# Patient Record
Sex: MALE | Race: WHITE | NOT HISPANIC OR LATINO | Employment: OTHER | URBAN - METROPOLITAN AREA
[De-identification: names, ages, dates, MRNs, and addresses within clinical notes are randomized per-mention and may not be internally consistent; named-entity substitution may affect disease eponyms.]

---

## 2021-07-16 ENCOUNTER — OFFICE VISIT (OUTPATIENT)
Dept: UROLOGY | Facility: CLINIC | Age: 61
End: 2021-07-16
Payer: OTHER GOVERNMENT

## 2021-07-16 VITALS
HEART RATE: 74 BPM | DIASTOLIC BLOOD PRESSURE: 81 MMHG | HEIGHT: 71 IN | WEIGHT: 165 LBS | BODY MASS INDEX: 23.1 KG/M2 | SYSTOLIC BLOOD PRESSURE: 117 MMHG

## 2021-07-16 DIAGNOSIS — R53.83 FATIGUE, UNSPECIFIED TYPE: ICD-10-CM

## 2021-07-16 DIAGNOSIS — N40.1 BPH WITH URINARY OBSTRUCTION: Primary | ICD-10-CM

## 2021-07-16 DIAGNOSIS — N13.8 BPH WITH URINARY OBSTRUCTION: Primary | ICD-10-CM

## 2021-07-16 DIAGNOSIS — N48.6 PEYRONIE DISEASE: ICD-10-CM

## 2021-07-16 DIAGNOSIS — N52.9 ERECTILE DYSFUNCTION, UNSPECIFIED ERECTILE DYSFUNCTION TYPE: ICD-10-CM

## 2021-07-16 LAB
HCT VFR BLD AUTO: 47.1 % (ref 42–52)
HGB BLD-MCNC: 15.9 G/DL (ref 14–18)
POC RESIDUAL URINE VOLUME: 57 ML (ref 0–100)
TESTOST SERPL-MCNC: 849 NG/DL (ref 193–740)

## 2021-07-16 PROCEDURE — 51798 US URINE CAPACITY MEASURE: CPT | Mod: S$GLB,,, | Performed by: NURSE PRACTITIONER

## 2021-07-16 PROCEDURE — 51798 POCT BLADDER SCAN: ICD-10-PCS | Mod: S$GLB,,, | Performed by: NURSE PRACTITIONER

## 2021-07-16 PROCEDURE — 99204 PR OFFICE/OUTPT VISIT, NEW, LEVL IV, 45-59 MIN: ICD-10-PCS | Mod: S$GLB,,, | Performed by: NURSE PRACTITIONER

## 2021-07-16 PROCEDURE — 99204 OFFICE O/P NEW MOD 45 MIN: CPT | Mod: S$GLB,,, | Performed by: NURSE PRACTITIONER

## 2021-07-16 RX ORDER — ALFUZOSIN HYDROCHLORIDE 10 MG/1
TABLET, EXTENDED RELEASE ORAL
COMMUNITY
End: 2021-07-27

## 2021-07-16 RX ORDER — LEVOCETIRIZINE DIHYDROCHLORIDE 5 MG/1
TABLET, FILM COATED ORAL
COMMUNITY

## 2021-07-19 ENCOUNTER — TELEPHONE (OUTPATIENT)
Dept: UROLOGY | Facility: CLINIC | Age: 61
End: 2021-07-19

## 2021-07-27 ENCOUNTER — OFFICE VISIT (OUTPATIENT)
Dept: UROLOGY | Facility: CLINIC | Age: 61
End: 2021-07-27
Payer: OTHER GOVERNMENT

## 2021-07-27 VITALS
SYSTOLIC BLOOD PRESSURE: 120 MMHG | DIASTOLIC BLOOD PRESSURE: 81 MMHG | HEART RATE: 86 BPM | WEIGHT: 165 LBS | BODY MASS INDEX: 23.1 KG/M2 | HEIGHT: 71 IN

## 2021-07-27 DIAGNOSIS — N13.8 BPH WITH URINARY OBSTRUCTION: ICD-10-CM

## 2021-07-27 DIAGNOSIS — N40.1 BPH WITH URINARY OBSTRUCTION: ICD-10-CM

## 2021-07-27 DIAGNOSIS — N48.6 PEYRONIE DISEASE: Primary | ICD-10-CM

## 2021-07-27 DIAGNOSIS — N52.9 ERECTILE DYSFUNCTION, UNSPECIFIED ERECTILE DYSFUNCTION TYPE: ICD-10-CM

## 2021-07-27 PROBLEM — R06.09 DYSPNEA ON EXERTION: Status: ACTIVE | Noted: 2021-07-27

## 2021-07-27 PROBLEM — R51.9 UNILATERAL HEADACHE: Status: ACTIVE | Noted: 2021-07-27

## 2021-07-27 PROBLEM — R53.83 FATIGUE: Status: ACTIVE | Noted: 2017-07-26

## 2021-07-27 PROBLEM — M54.50 LOW BACK PAIN: Status: ACTIVE | Noted: 2021-07-27

## 2021-07-27 PROBLEM — K59.09 CHRONIC CONSTIPATION: Status: ACTIVE | Noted: 2017-08-11

## 2021-07-27 PROBLEM — B00.9 HERPES SIMPLEX: Status: ACTIVE | Noted: 2021-07-27

## 2021-07-27 PROBLEM — R31.9 BLOOD IN URINE: Status: ACTIVE | Noted: 2017-11-02

## 2021-07-27 PROBLEM — R10.9 RIGHT FLANK PAIN: Status: ACTIVE | Noted: 2017-10-05

## 2021-07-27 PROBLEM — R20.2 PARESTHESIA OF UPPER LIMB: Status: ACTIVE | Noted: 2021-07-27

## 2021-07-27 PROBLEM — M54.12 CERVICAL RADICULOPATHY: Status: ACTIVE | Noted: 2021-07-27

## 2021-07-27 PROBLEM — F43.29 ADJUSTMENT DISORDER WITH MIXED EMOTIONAL FEATURES: Status: ACTIVE | Noted: 2017-07-26

## 2021-07-27 PROBLEM — K12.1 ULCER OF MOUTH: Status: ACTIVE | Noted: 2021-07-27

## 2021-07-27 PROBLEM — M19.019 ARTHRITIS OF SHOULDER: Status: ACTIVE | Noted: 2017-12-03

## 2021-07-27 PROCEDURE — 99213 OFFICE O/P EST LOW 20 MIN: CPT | Mod: S$GLB,,, | Performed by: UROLOGY

## 2021-07-27 PROCEDURE — 99213 PR OFFICE/OUTPT VISIT, EST, LEVL III, 20-29 MIN: ICD-10-PCS | Mod: S$GLB,,, | Performed by: UROLOGY

## 2021-07-27 RX ORDER — TAMSULOSIN HYDROCHLORIDE 0.4 MG/1
CAPSULE ORAL DAILY
COMMUNITY

## 2021-10-05 ENCOUNTER — TELEPHONE (OUTPATIENT)
Dept: UROLOGY | Facility: CLINIC | Age: 61
End: 2021-10-05

## 2021-10-15 ENCOUNTER — OFFICE VISIT (OUTPATIENT)
Dept: UROLOGY | Facility: CLINIC | Age: 61
End: 2021-10-15
Payer: OTHER GOVERNMENT

## 2021-10-15 DIAGNOSIS — N48.6 PEYRONIE DISEASE: ICD-10-CM

## 2021-10-15 DIAGNOSIS — N52.9 ERECTILE DYSFUNCTION, UNSPECIFIED ERECTILE DYSFUNCTION TYPE: ICD-10-CM

## 2021-10-15 DIAGNOSIS — N40.1 BPH WITH URINARY OBSTRUCTION: Primary | ICD-10-CM

## 2021-10-15 DIAGNOSIS — N13.8 BPH WITH URINARY OBSTRUCTION: Primary | ICD-10-CM

## 2021-10-15 PROCEDURE — 99213 PR OFFICE/OUTPT VISIT, EST, LEVL III, 20-29 MIN: ICD-10-PCS | Mod: S$GLB,,, | Performed by: NURSE PRACTITIONER

## 2021-10-15 PROCEDURE — 99213 OFFICE O/P EST LOW 20 MIN: CPT | Mod: S$GLB,,, | Performed by: NURSE PRACTITIONER

## 2021-10-21 ENCOUNTER — TELEPHONE (OUTPATIENT)
Dept: UROLOGY | Facility: CLINIC | Age: 61
End: 2021-10-21

## 2021-10-22 ENCOUNTER — TELEPHONE (OUTPATIENT)
Dept: UROLOGY | Facility: CLINIC | Age: 61
End: 2021-10-22
Payer: OTHER GOVERNMENT

## 2021-10-22 NOTE — TELEPHONE ENCOUNTER
"Patient notified that [facility] to [facility] for 09/15/2021.    Per Representative at [facility]- LEV "administrativly denied" due to provider at [facility] not responding to [facility]. I was advised to resend LEV with office visit notes and contact [facility] next week around 10/27/21 or 10/28/21 and check on status-was told if [facility] provider has not responded to LEV by that time then a message would be sent to [facility] provider by [facility].     Patient up-dated on LEV with Xiaflex request. Stated he is also having a "spraying stream" and feels he has to strain at times. Uroxartral caused adverse side effects. Inquiring if Flomax can be increased to 2 tablets daily.    "

## 2021-10-22 NOTE — TELEPHONE ENCOUNTER
----- Message from Carol Ann Devi sent at 10/18/2021  4:50 PM CDT -----    ----- Message -----  From: More Mo  Sent: 10/18/2021   2:34 PM CDT  To: Brittany Payan Staff    States he's calling regarding his treatment plan. States he learned that [facility], [facility] Special[facility] is no longer there. The Fax# for [facility], Fax # [phone] & there phone# [phone], option 3. States Dr Soto needs to send his treatment pain xiaflex too that fax#. If you have any questions on what Dr Soto needs to fax, please give him a call. He would like someone to give him a call back. Please call pt [phone]. Thank you

## 2021-11-17 ENCOUNTER — TELEPHONE (OUTPATIENT)
Dept: UROLOGY | Facility: CLINIC | Age: 61
End: 2021-11-17
Payer: OTHER GOVERNMENT

## 2021-11-17 NOTE — TELEPHONE ENCOUNTER
Spoke with [facility] authorization representative Eleni-Stated Addended ANCA was received 11/09/2021 and is still pending review. Stated she will send a message to provider for update on request for service/authorization for Xiaflex injection.

## 2021-11-22 ENCOUNTER — TELEPHONE (OUTPATIENT)
Dept: UROLOGY | Facility: CLINIC | Age: 61
End: 2021-11-22
Payer: OTHER GOVERNMENT

## 2021-11-23 ENCOUNTER — TELEPHONE (OUTPATIENT)
Dept: UROLOGY | Facility: CLINIC | Age: 61
End: 2021-11-23
Payer: OTHER GOVERNMENT

## 2021-11-24 ENCOUNTER — TELEPHONE (OUTPATIENT)
Dept: UROLOGY | Facility: CLINIC | Age: 61
End: 2021-11-24
Payer: OTHER GOVERNMENT

## 2021-11-24 DIAGNOSIS — N48.6 PEYRONIE DISEASE: Primary | ICD-10-CM

## 2021-12-10 ENCOUNTER — TELEPHONE (OUTPATIENT)
Dept: UROLOGY | Facility: CLINIC | Age: 61
End: 2021-12-10
Payer: OTHER GOVERNMENT

## 2021-12-16 ENCOUNTER — OFFICE VISIT (OUTPATIENT)
Dept: UROLOGY | Facility: CLINIC | Age: 61
End: 2021-12-16
Payer: OTHER GOVERNMENT

## 2021-12-16 VITALS
BODY MASS INDEX: 23.1 KG/M2 | HEIGHT: 71 IN | HEART RATE: 82 BPM | DIASTOLIC BLOOD PRESSURE: 70 MMHG | WEIGHT: 165 LBS | SYSTOLIC BLOOD PRESSURE: 118 MMHG

## 2021-12-16 DIAGNOSIS — N48.6 PEYRONIE DISEASE: ICD-10-CM

## 2021-12-16 DIAGNOSIS — R30.0 DYSURIA: Primary | ICD-10-CM

## 2021-12-16 DIAGNOSIS — N40.1 BPH WITH OBSTRUCTION/LOWER URINARY TRACT SYMPTOMS: ICD-10-CM

## 2021-12-16 DIAGNOSIS — N13.8 BPH WITH OBSTRUCTION/LOWER URINARY TRACT SYMPTOMS: ICD-10-CM

## 2021-12-16 PROCEDURE — 81001 URINALYSIS AUTO W/SCOPE: CPT | Mod: S$GLB,,, | Performed by: UROLOGY

## 2021-12-16 PROCEDURE — 81001 PR  URINALYSIS, AUTO, W/SCOPE: ICD-10-PCS | Mod: S$GLB,,, | Performed by: UROLOGY

## 2021-12-16 PROCEDURE — 99214 PR OFFICE/OUTPT VISIT, EST, LEVL IV, 30-39 MIN: ICD-10-PCS | Mod: S$GLB,,, | Performed by: UROLOGY

## 2021-12-16 PROCEDURE — 99214 OFFICE O/P EST MOD 30 MIN: CPT | Mod: S$GLB,,, | Performed by: UROLOGY

## 2021-12-16 PROCEDURE — 51798 US URINE CAPACITY MEASURE: CPT | Mod: S$GLB,,, | Performed by: UROLOGY

## 2021-12-16 PROCEDURE — 51798 PR MEAS,POST-VOID RES,US,NON-IMAGING: ICD-10-PCS | Mod: S$GLB,,, | Performed by: UROLOGY

## 2021-12-29 ENCOUNTER — TELEPHONE (OUTPATIENT)
Dept: UROLOGY | Facility: CLINIC | Age: 61
End: 2021-12-29
Payer: OTHER GOVERNMENT

## 2021-12-30 ENCOUNTER — PROCEDURE VISIT (OUTPATIENT)
Dept: UROLOGY | Facility: CLINIC | Age: 61
End: 2021-12-30
Payer: OTHER GOVERNMENT

## 2021-12-30 VITALS
RESPIRATION RATE: 16 BRPM | SYSTOLIC BLOOD PRESSURE: 139 MMHG | OXYGEN SATURATION: 97 % | DIASTOLIC BLOOD PRESSURE: 69 MMHG | HEART RATE: 65 BPM

## 2021-12-30 DIAGNOSIS — N40.1 BPH WITH OBSTRUCTION/LOWER URINARY TRACT SYMPTOMS: ICD-10-CM

## 2021-12-30 DIAGNOSIS — R30.0 DYSURIA: ICD-10-CM

## 2021-12-30 DIAGNOSIS — N13.8 BPH WITH OBSTRUCTION/LOWER URINARY TRACT SYMPTOMS: ICD-10-CM

## 2021-12-30 PROCEDURE — 52000 CYSTOURETHROSCOPY: CPT | Mod: S$GLB,,, | Performed by: UROLOGY

## 2021-12-30 PROCEDURE — 52000 CYSTOSCOPY: ICD-10-PCS | Mod: S$GLB,,, | Performed by: UROLOGY

## 2022-01-06 LAB
ANION GAP SERPL CALC-SCNC: 7 MMOL/L (ref 3–11)
APPEARANCE, UA: CLEAR
BASOPHILS NFR BLD: 0.8 % (ref 0–3)
BILIRUB UR QL STRIP: NEGATIVE MG/DL
BUN SERPL-MCNC: 14 MG/DL (ref 7–18)
BUN/CREAT SERPL: 13.59 RATIO (ref 7–18)
CALCIUM SERPL-MCNC: 9.7 MG/DL (ref 8.8–10.5)
CHLORIDE SERPL-SCNC: 104 MMOL/L (ref 100–108)
CO2 SERPL-SCNC: 31 MMOL/L (ref 21–32)
COLOR UR: NORMAL
CREAT SERPL-MCNC: 1.03 MG/DL (ref 0.7–1.3)
EOSINOPHIL NFR BLD: 2 % (ref 1–3)
ERYTHROCYTE [DISTWIDTH] IN BLOOD BY AUTOMATED COUNT: 13.2 % (ref 12.5–18)
GFR ESTIMATION: > 60
GLUCOSE (UA): NORMAL MG/DL
GLUCOSE SERPL-MCNC: 92 MG/DL (ref 70–110)
HCT VFR BLD AUTO: 48.7 % (ref 42–52)
HGB BLD-MCNC: 16.5 G/DL (ref 14–18)
HGB UR QL STRIP: NEGATIVE /UL
KETONES UR QL STRIP: NEGATIVE MG/DL
LEUKOCYTE ESTERASE UR QL STRIP: NEGATIVE /UL
LYMPHOCYTES NFR BLD: 30.6 % (ref 25–40)
MCH RBC QN AUTO: 29.8 PG (ref 27–31.2)
MCHC RBC AUTO-ENTMCNC: 33.9 G/DL (ref 31.8–35.4)
MCV RBC AUTO: 87.9 FL (ref 80–97)
MONOCYTES NFR BLD: 10.3 % (ref 1–15)
NEUTROPHILS # BLD AUTO: 3.43 10*3/UL (ref 1.8–7.7)
NEUTROPHILS NFR BLD: 56 % (ref 37–80)
NITRITE UR QL STRIP: NEGATIVE
NUCLEATED RED BLOOD CELLS: 0 %
PH UR STRIP: 6.5 PH (ref 5–9)
PLATELETS: 287 10*3/UL (ref 142–424)
POTASSIUM SERPL-SCNC: 4.6 MMOL/L (ref 3.6–5.2)
PROT UR QL STRIP: NEGATIVE MG/DL
RBC # BLD AUTO: 5.54 10*6/UL (ref 4.7–6.1)
SODIUM BLD-SCNC: 142 MMOL/L (ref 135–145)
SP GR UR STRIP: 1.01 (ref 1–1.03)
SPECIMEN COLLECTION METHOD, URINE: NORMAL
UROBILINOGEN UR STRIP-ACNC: NORMAL MG/DL
WBC # BLD: 6.1 10*3/UL (ref 4.6–10.2)

## 2022-01-17 ENCOUNTER — OUTSIDE PLACE OF SERVICE (OUTPATIENT)
Dept: UROLOGY | Facility: CLINIC | Age: 62
End: 2022-01-17
Payer: OTHER GOVERNMENT

## 2022-01-17 PROCEDURE — 52601 PROSTATECTOMY (TURP): CPT | Mod: ,,, | Performed by: UROLOGY

## 2022-01-17 PROCEDURE — 52601 PR TRANSURETHRAL ELEC-SURG PROSTATECTOM: ICD-10-PCS | Mod: ,,, | Performed by: UROLOGY

## 2022-01-18 LAB — SPECIMEN TO PATHOLOGY: NORMAL

## 2022-02-10 ENCOUNTER — OFFICE VISIT (OUTPATIENT)
Dept: UROLOGY | Facility: CLINIC | Age: 62
End: 2022-02-10
Payer: OTHER GOVERNMENT

## 2022-02-10 VITALS
DIASTOLIC BLOOD PRESSURE: 59 MMHG | HEART RATE: 76 BPM | WEIGHT: 165 LBS | SYSTOLIC BLOOD PRESSURE: 120 MMHG | BODY MASS INDEX: 23.1 KG/M2 | HEIGHT: 71 IN | RESPIRATION RATE: 18 BRPM

## 2022-02-10 DIAGNOSIS — N13.8 BPH WITH OBSTRUCTION/LOWER URINARY TRACT SYMPTOMS: Primary | ICD-10-CM

## 2022-02-10 DIAGNOSIS — N40.1 BPH WITH OBSTRUCTION/LOWER URINARY TRACT SYMPTOMS: Primary | ICD-10-CM

## 2022-02-10 LAB — POC RESIDUAL URINE VOLUME: 0 ML (ref 0–100)

## 2022-02-10 PROCEDURE — 99024 POSTOP FOLLOW-UP VISIT: CPT | Mod: S$GLB,,, | Performed by: UROLOGY

## 2022-02-10 PROCEDURE — 51798 POCT BLADDER SCAN: ICD-10-PCS | Mod: S$GLB,,, | Performed by: UROLOGY

## 2022-02-10 PROCEDURE — 51798 US URINE CAPACITY MEASURE: CPT | Mod: S$GLB,,, | Performed by: UROLOGY

## 2022-02-10 PROCEDURE — 99024 PR POST-OP FOLLOW-UP VISIT: ICD-10-PCS | Mod: S$GLB,,, | Performed by: UROLOGY

## 2022-02-10 NOTE — PROGRESS NOTES
Three weeks postop TURP.  Patient doing very well has no complaints his pathology revealed 7 g resection with no cancer in the specimen.  He does still occasionally have some blood in his urine I have answered his questions related to that.  Patient is attempting to get Xiaflex we will see how that goes and call him once his Xiaflex is in

## 2022-03-21 ENCOUNTER — TELEPHONE (OUTPATIENT)
Dept: UROLOGY | Facility: CLINIC | Age: 62
End: 2022-03-21
Payer: OTHER GOVERNMENT

## 2022-03-21 NOTE — TELEPHONE ENCOUNTER
Pt called and having issues with slower stream and spraying 5 seconds while urinating. Pt also having questions about xiaflex. opal is speaking with him, [facility] is not [facility].    ----- Message from Jennie Bustillos sent at 3/21/2022  1:43 PM CDT -----  Pt wants to speak with nurse regarding f/u on the turps. call back is [phone]

## 2022-03-22 NOTE — TELEPHONE ENCOUNTER
03/21/22 ~46 minutes spoent on phone conversation with patient explainging the process for ordering xiaflex and all routes we have attempted to get approval through [facility] to get Xiaflex ordered from [facility] or [facility]. Encouraged patietn to reach out to [facility] about getting authorization to either: 1. Payment to [facility] 2. [facility] to dispense medication and ship to office.

## 2022-06-20 ENCOUNTER — TELEPHONE (OUTPATIENT)
Dept: UROLOGY | Facility: CLINIC | Age: 62
End: 2022-06-20
Payer: OTHER GOVERNMENT

## 2022-06-20 NOTE — TELEPHONE ENCOUNTER
Attempted to contact pt. Left VM..    ----- Message from Leona Carreon sent at 6/20/2022 12:55 PM CDT -----  Contact: PT  .Type:  Sooner Appointment Request    Caller is requesting a sooner appointment.  Caller declined first available appointment listed below.  Caller will not accept being placed on the waitlist and is requesting a message be sent to doctor.  Name of Caller: Jarrod   When is the first available appointment?  08/11/2022   Symptoms:  f/u   Would the patient rather a call back or a response via [facility]?  Callback   Best Call Back Number: .[phone] (home)     Additional Information:  status of updated referral

## 2022-06-20 NOTE — TELEPHONE ENCOUNTER
Pt calling regarding weak stream. I have scheduled pt to see Brittany.    ----- Message from Barbie Guzman sent at 6/20/2022  2:29 PM CDT -----  .Type:  Patient Returning Call    Who Called:SELF  Who Left Message for Patient: N/A  Does the patient know what this is regarding?: YES  Would the patient rather a call back or a response via [facility]? CALL  Best Call Back Number:.[phone]

## 2022-07-14 ENCOUNTER — TELEPHONE (OUTPATIENT)
Dept: UROLOGY | Facility: CLINIC | Age: 62
End: 2022-07-14

## 2022-07-14 ENCOUNTER — OFFICE VISIT (OUTPATIENT)
Dept: UROLOGY | Facility: CLINIC | Age: 62
End: 2022-07-14
Payer: OTHER GOVERNMENT

## 2022-07-14 VITALS
HEIGHT: 71 IN | DIASTOLIC BLOOD PRESSURE: 73 MMHG | SYSTOLIC BLOOD PRESSURE: 138 MMHG | HEART RATE: 68 BPM | BODY MASS INDEX: 23.1 KG/M2 | WEIGHT: 165 LBS | TEMPERATURE: 98 F | RESPIRATION RATE: 18 BRPM

## 2022-07-14 DIAGNOSIS — N40.1 BPH WITH OBSTRUCTION/LOWER URINARY TRACT SYMPTOMS: Primary | ICD-10-CM

## 2022-07-14 DIAGNOSIS — N13.8 BPH WITH OBSTRUCTION/LOWER URINARY TRACT SYMPTOMS: Primary | ICD-10-CM

## 2022-07-14 DIAGNOSIS — N48.6 PEYRONIE'S DISEASE: ICD-10-CM

## 2022-07-14 PROCEDURE — 99214 PR OFFICE/OUTPT VISIT, EST, LEVL IV, 30-39 MIN: ICD-10-PCS | Mod: S$GLB,ICN,, | Performed by: UROLOGY

## 2022-07-14 PROCEDURE — 99214 OFFICE O/P EST MOD 30 MIN: CPT | Mod: S$GLB,ICN,, | Performed by: UROLOGY

## 2022-07-14 NOTE — PROGRESS NOTES
Subjective:       Patient ID: Jarrod Silva is a 61 y.o. male.    Chief Complaint: Other (Slow urine stream)      HPI:  61-year-old male status post TURP proximally 6 or 7 months ago his stream was good initially however he states that he starting to have a decrease in stream and requires double voiding.  He also has Peyronie's disease which he was set up to have Xiaflex for however there was some issue with [facility] insurance coverage    Past Medical History:   Past Medical History:   Diagnosis Date    BPH (benign prostatic hyperplasia)     Peyronie's disease        Past Surgical Historical:   Past Surgical History:   Procedure Laterality Date    CYSTOSCOPY      HERNIA REPAIR      TRANSURETHRAL RESECTION OF PROSTATE          Medications:   Medication List with Changes/Refills   Current Medications    COLLAGENASE (XIAFLEX) 0.9 MG SOLR INJECTION    0.58 mg by Intra-Lesional route On call Procedure (for clinic administration).    LEVOCETIRIZINE (XYZAL) 5 MG TABLET    levocetirizine 5 mg tablet   1 POQD    TAMSULOSIN (FLOMAX) 0.4 MG CAP    Take by mouth once daily.        Past Social History:   Social History     Socioeconomic History    Marital status: Unknown   Tobacco Use    Smoking status: Never Smoker    Smokeless tobacco: Never Used   Substance and Sexual Activity    Alcohol use: Not Currently    Drug use: Not Currently    Sexual activity: Yes     Partners: Female     Birth control/protection: Condom       Allergies:   Review of patient's allergies indicates:   Allergen Reactions    Guaifenesin Itching        Family History:   Family History   Problem Relation Age of Onset    Heart disease Father     COPD Mother         Review of Systems:  Review of Systems   Constitutional: Negative for activity change and appetite change.   HENT: Negative for congestion and dental problem.    Eyes: Negative for visual disturbance.   Respiratory: Negative for chest tightness and shortness of breath.    Cardiovascular:  Negative for chest pain.   Gastrointestinal: Negative for abdominal distention and abdominal pain.   Genitourinary: Negative for decreased urine volume, difficulty urinating, dysuria, enuresis, flank pain, frequency, genital sores, hematuria, penile discharge, penile pain, penile swelling, scrotal swelling, testicular pain and urgency.   Musculoskeletal: Negative for back pain and neck pain.   Skin: Negative for color change.   Neurological: Negative for dizziness.   Hematological: Negative for adenopathy.   Psychiatric/Behavioral: Negative for agitation, behavioral problems and confusion.       Physical Exam:  Physical Exam  Constitutional:       General: He is not in acute distress.     Appearance: He is well-developed.   HENT:      Head: Normocephalic and atraumatic.      Nose: Nose normal.   Eyes:      General: No scleral icterus.     Conjunctiva/sclera: Conjunctivae normal.      Pupils: Pupils are equal, round, and reactive to light.   Neck:      Thyroid: No thyromegaly.      Trachea: No tracheal deviation.   Cardiovascular:      Rate and Rhythm: Normal rate and regular rhythm.      Heart sounds: Normal heart sounds.   Pulmonary:      Effort: Pulmonary effort is normal. No respiratory distress.      Breath sounds: Normal breath sounds. No wheezing or rales.   Abdominal:      General: Bowel sounds are normal. There is no distension.      Palpations: Abdomen is soft.      Tenderness: There is no abdominal tenderness. There is no guarding or rebound.   Genitourinary:     Penis: Normal. No tenderness.       Prostate: Normal.   Musculoskeletal:         General: No deformity. Normal range of motion.      Cervical back: Neck supple.   Lymphadenopathy:      Cervical: No cervical adenopathy.   Skin:     General: Skin is warm and dry.      Findings: No erythema or rash.   Neurological:      Mental Status: He is alert and oriented to person, place, and time.      Cranial Nerves: No cranial nerve deficit.   Psychiatric:          Behavior: Behavior normal.         Assessment/Plan:       Problem List Items Addressed This Visit    None     Visit Diagnoses     BPH with obstruction/lower urinary tract symptoms    -  Primary    Peyronie's disease                 Peyronie's disease:  Patient has 30 degree dorsal curvature which is stable no longer painful he has a palpable plaque will set him up for Xiaflex    BPH:  Patient had a TURP initially was successful however he has noticed some decrease in his stream will set him up for cystoscopy to evaluate for bladder neck contracture

## 2022-07-14 NOTE — TELEPHONE ENCOUNTER
Attempted to contact pt, no answer [facility]    ----- Message from Theo Grove sent at 7/14/2022  8:52 AM CDT -----  Contact: pt  .Type:  Sooner Apoointment Request    Caller is requesting a sooner appointment.  Caller declined first available appointment listed below.  Caller will not accept being placed on the waitlist and is requesting a message be sent to doctor.  Name of Caller:torrie  When is the first available appointment?9/6  Symptoms:sooner appt  Would the patient rather a call back or a response via [facility]? callback  Best Call Back Number:.[phone]    Additional Information:

## 2022-08-04 ENCOUNTER — TELEPHONE (OUTPATIENT)
Dept: UROLOGY | Facility: CLINIC | Age: 62
End: 2022-08-04
Payer: OTHER GOVERNMENT

## 2022-08-04 NOTE — TELEPHONE ENCOUNTER
----- Message from Dariela Villalba sent at 8/4/2022  2:42 PM CDT -----   Patient states he need to speak with nurse regarding his scheduled  procedure on 8/9. Call back number [phone] (home)

## 2022-08-08 ENCOUNTER — TELEPHONE (OUTPATIENT)
Dept: UROLOGY | Facility: CLINIC | Age: 62
End: 2022-08-08
Payer: OTHER GOVERNMENT

## 2022-08-09 ENCOUNTER — PROCEDURE VISIT (OUTPATIENT)
Dept: UROLOGY | Facility: CLINIC | Age: 62
End: 2022-08-09
Payer: OTHER GOVERNMENT

## 2022-08-09 VITALS
HEART RATE: 61 BPM | BODY MASS INDEX: 25.23 KG/M2 | SYSTOLIC BLOOD PRESSURE: 129 MMHG | DIASTOLIC BLOOD PRESSURE: 70 MMHG | OXYGEN SATURATION: 98 % | RESPIRATION RATE: 20 BRPM | WEIGHT: 180.25 LBS | HEIGHT: 71 IN

## 2022-08-09 DIAGNOSIS — N40.1 BPH WITH OBSTRUCTION/LOWER URINARY TRACT SYMPTOMS: ICD-10-CM

## 2022-08-09 DIAGNOSIS — N13.8 BPH WITH OBSTRUCTION/LOWER URINARY TRACT SYMPTOMS: ICD-10-CM

## 2022-08-09 DIAGNOSIS — N35.919 STRICTURE OF MALE URETHRA, UNSPECIFIED STRICTURE TYPE: Primary | ICD-10-CM

## 2022-08-09 PROCEDURE — 52000 CYSTOURETHROSCOPY: CPT | Mod: S$GLB,,, | Performed by: UROLOGY

## 2022-08-09 PROCEDURE — 52000 CYSTOSCOPY: ICD-10-PCS | Mod: S$GLB,,, | Performed by: UROLOGY

## 2022-08-09 NOTE — PROGRESS NOTES
Pre-op instructions and surgery consent DVIU reviewed with pt. Pt verbalized understanding. Surgery consent signed by pt.

## 2022-08-09 NOTE — PATIENT INSTRUCTIONS
Patient Education       Cystoscopy Discharge Instructions   About this topic   Your kidneys make urine. It is stored in your bladder. The urethra is a tube at the bottom of the bladder. Urine flows out of this tube. Sometimes, there is a blockage and urine is not able to leave the body.  A cystoscopy is a procedure that lets the doctor see the inside of your bladder and urethra. The doctor does it to:  Look for stones or tumors blocking the bladder and urethra  Look for changes or injury inside the bladder  Take a tissue sample from the inside of your bladder  Look for reasons for blood in the urine, pain with urination, or why you are passing urine often  Look for prostate problems     What care is needed at home?   Ask your doctor what you need to do when you go home. Make sure you ask questions if you do not understand what the doctor says. This way you will know what you need to do.  Take a warm bath or use a warm wet washcloth over the opening to the urethra. This will help to ease any pain. Do this as needed.  Drink 6 to 8 glasses of water a day and 3 to 4 glasses in the first few hours after the procedure to flush out your bladder and reduce irritation.  You may see some blood in your urine for a few days. This is normal.  Empty your bladder as soon as you feel the need to. Don't delay going to the bathroom. It stretches and weakens the bladder.  What follow-up care is needed?   Your doctor may ask you to make visits to the office to check on your progress. Be sure to keep these visits.  If you had a biopsy, talk with your doctor about the results.  What drugs may be needed?   The doctor may order drugs to:  Help with pain  Fight an infection  Help with bladder spasms  Will physical activity be limited?   Talk to your doctor about when you may go back to your normal activities like work, driving, or sex.  What problems could happen?   Bleeding  Infection  Injury to the bladder and urethra  Discomfort in the  urethra area  Burning sensation for a short time  Upset stomach  When do I need to call the doctor?   Signs of infection. These include a fever of 100.4°F (38°C) or higher, chills, pain with passing urine.  Pain that does not go away even with drugs or that lasts longer than 2 days  Too much blood in your urine  Passing large dime-sized clots  Cloudy urine  Little or no urine or not able to pass urine  Abdominal pain and nausea  Teach Back: Helping You Understand   The Teach Back Method helps you understand the information we are giving you. After you talk with the staff, tell them in your own words what you learned. This helps to make sure the staff has described each thing clearly. It also helps to explain things that may have been confusing. Before going home, make sure you can do these:  I can tell you about my procedure.  I can tell you what may help ease my pain.  I can tell you what I will do if I have a fever, chills, or am not able to pass urine.  Where can I learn more?   [employer] Cancer Society  https://www.cancer.org/treatment/understanding-your-diagnosis/tests/endoscopy/cystoscopy.html   [employer]  https://www.cancerresearchuk.org/about-cancer/bladder-cancer/getting-diagnosed/tests-diagnose/cystoscopy   NHS Choices  http://www.nhs.uk/conditions/Cystoscopy/Pages/Introduction.aspx   Last Reviewed Date   2021-04-22  Consumer Information Use and Disclaimer   This information is not specific medical advice and does not replace information you receive from your health care provider. This is only a brief summary of general information. It does NOT include all information about conditions, illnesses, injuries, tests, procedures, treatments, therapies, discharge instructions or life-style choices that may apply to you. You must talk with your health care provider for complete information about your health and treatment options. This information should not be used to decide whether or not to accept your  health care providers advice, instructions or recommendations. Only your health care provider has the knowledge and training to provide advice that is right for you.  Copyright   Copyright © 2021 [employer] and its affiliates and/or licensors. All rights reserved.

## 2022-08-09 NOTE — PROCEDURES
Cystoscopy    Date/Time: 8/9/2022 10:30 AM  Performed by: Schuyler Soto MD  Authorized by: Schuyler Soto MD     Consent Done?:  Yes (Written)  Timeout: prior to procedure the correct patient, procedure, and site was verified    Prep: patient was prepped and draped in usual sterile fashion    Anesthesia:  Intraurethral instillation  Indications: hematuria    Position:  Supine  Anesthesia:  Intraurethral instillation  Patient sedated?: No    Preparation: Patient was prepped and draped in usual sterile fashion    Scope type:  Flexible cystoscope  External exam normal: Yes    Urethra normal: Yes    Prostate normal: Yes    Comments:      Patient was brought to the procedure room placed on the table padded prepped and draped in usual sterile fashion in supine position. The cystoscope was inserted into the urethra and advanced the urethra was normal until proximally the distal bulbar urethra which point a very short and thin stricture was noted it was approximately 5 [place] and could not passed with the scope at this point the scope was removed the patient tolerated the procedure well there were no complications    Impression:  Urethral stricture disease we will proceed with DVIU next available date

## 2022-08-10 LAB
ANION GAP SERPL CALC-SCNC: 9 MMOL/L (ref 3–11)
APPEARANCE, UA: CLEAR
BASOPHILS NFR BLD: 0.5 % (ref 0–3)
BILIRUB UR QL STRIP: NEGATIVE MG/DL
BUN SERPL-MCNC: 12 MG/DL (ref 7–18)
BUN/CREAT SERPL: 12.76 RATIO (ref 7–18)
CALCIUM SERPL-MCNC: 9.5 MG/DL (ref 8.8–10.5)
CHLORIDE SERPL-SCNC: 104 MMOL/L (ref 100–108)
CO2 SERPL-SCNC: 29 MMOL/L (ref 21–32)
COLOR UR: NORMAL
CREAT SERPL-MCNC: 0.94 MG/DL (ref 0.7–1.3)
EOSINOPHIL NFR BLD: 1.8 % (ref 1–3)
ERYTHROCYTE [DISTWIDTH] IN BLOOD BY AUTOMATED COUNT: 12.8 % (ref 12.5–18)
GFR ESTIMATION: > 60
GLUCOSE (UA): NORMAL MG/DL
GLUCOSE SERPL-MCNC: 87 MG/DL (ref 70–110)
HCT VFR BLD AUTO: 44.9 % (ref 42–52)
HGB BLD-MCNC: 14.9 G/DL (ref 14–18)
HGB UR QL STRIP: NEGATIVE /UL
KETONES UR QL STRIP: NEGATIVE MG/DL
LEUKOCYTE ESTERASE UR QL STRIP: NEGATIVE /UL
LYMPHOCYTES NFR BLD: 28.8 % (ref 25–40)
MCH RBC QN AUTO: 28.5 PG (ref 27–31.2)
MCHC RBC AUTO-ENTMCNC: 33.2 G/DL (ref 31.8–35.4)
MCV RBC AUTO: 85.9 FL (ref 80–97)
MONOCYTES NFR BLD: 11.7 % (ref 1–15)
NEUTROPHILS # BLD AUTO: 3.79 10*3/UL (ref 1.8–7.7)
NEUTROPHILS NFR BLD: 56.9 % (ref 37–80)
NITRITE UR QL STRIP: NEGATIVE
NUCLEATED RED BLOOD CELLS: 0 %
PH UR STRIP: 7 PH (ref 5–9)
PLATELETS: 306 10*3/UL (ref 142–424)
POTASSIUM SERPL-SCNC: 4.5 MMOL/L (ref 3.6–5.2)
PROT UR QL STRIP: NEGATIVE MG/DL
RBC # BLD AUTO: 5.23 10*6/UL (ref 4.7–6.1)
SODIUM BLD-SCNC: 142 MMOL/L (ref 135–145)
SP GR UR STRIP: 1.01 (ref 1–1.03)
SPECIMEN COLLECTION METHOD, URINE: NORMAL
UROBILINOGEN UR STRIP-ACNC: NORMAL MG/DL
WBC # BLD: 6.7 10*3/UL (ref 4.6–10.2)

## 2022-08-12 ENCOUNTER — OUTSIDE PLACE OF SERVICE (OUTPATIENT)
Dept: UROLOGY | Facility: CLINIC | Age: 62
End: 2022-08-12
Payer: OTHER GOVERNMENT

## 2022-08-12 ENCOUNTER — TELEPHONE (OUTPATIENT)
Dept: UROLOGY | Facility: CLINIC | Age: 62
End: 2022-08-12
Payer: OTHER GOVERNMENT

## 2022-08-12 PROCEDURE — 52276 CYSTOSCOPY AND TREATMENT: CPT | Mod: ,,, | Performed by: UROLOGY

## 2022-08-12 PROCEDURE — 52276 PR CYSTOSCOPY,DIR VIS INT URETHROTOMY: ICD-10-PCS | Mod: ,,, | Performed by: UROLOGY

## 2022-08-12 NOTE — TELEPHONE ENCOUNTER
Attempted to contact, will fax results. [facility]    ----- Message from Schuyler Soto MD sent at 8/11/2022  3:49 PM CDT -----  Please send a copy to pt pcp

## 2022-08-18 ENCOUNTER — TELEPHONE (OUTPATIENT)
Dept: UROLOGY | Facility: CLINIC | Age: 62
End: 2022-08-18
Payer: OTHER GOVERNMENT

## 2022-08-18 NOTE — TELEPHONE ENCOUNTER
----- Message from Luciana Medina sent at 8/18/2022  9:19 AM CDT -----  Contact: self  Pt calling for a follow up appt from on surgery on 8/12/2022. Pt needed a one to  two week follow up. Pt appt available until 10/4/2022.  Pt can be reached at [phone]     Thanks,

## 2022-08-30 ENCOUNTER — OFFICE VISIT (OUTPATIENT)
Dept: UROLOGY | Facility: CLINIC | Age: 62
End: 2022-08-30
Payer: OTHER GOVERNMENT

## 2022-08-30 VITALS — TEMPERATURE: 99 F | SYSTOLIC BLOOD PRESSURE: 159 MMHG | DIASTOLIC BLOOD PRESSURE: 90 MMHG | HEART RATE: 66 BPM

## 2022-08-30 DIAGNOSIS — N35.914 STRICTURE OF ANTERIOR URETHRA IN MALE, UNSPECIFIED STRICTURE TYPE: Primary | ICD-10-CM

## 2022-08-30 PROCEDURE — 99024 PR POST-OP FOLLOW-UP VISIT: ICD-10-PCS | Mod: S$GLB,,, | Performed by: UROLOGY

## 2022-08-30 PROCEDURE — 99024 POSTOP FOLLOW-UP VISIT: CPT | Mod: S$GLB,,, | Performed by: UROLOGY
